# Patient Record
Sex: FEMALE | URBAN - METROPOLITAN AREA
[De-identification: names, ages, dates, MRNs, and addresses within clinical notes are randomized per-mention and may not be internally consistent; named-entity substitution may affect disease eponyms.]

---

## 2020-03-10 ENCOUNTER — APPOINTMENT (RX ONLY)
Dept: URBAN - METROPOLITAN AREA CLINIC 168 | Facility: CLINIC | Age: 30
Setting detail: DERMATOLOGY
End: 2020-03-10

## 2020-03-10 DIAGNOSIS — B36.0 PITYRIASIS VERSICOLOR: ICD-10-CM

## 2020-03-10 PROCEDURE — 87220 TISSUE EXAM FOR FUNGI: CPT

## 2020-03-10 PROCEDURE — ? KOH PREP

## 2020-03-10 PROCEDURE — ? COUNSELING

## 2020-03-10 PROCEDURE — 99202 OFFICE O/P NEW SF 15 MIN: CPT

## 2020-03-10 PROCEDURE — ? PRESCRIPTION

## 2020-03-10 RX ORDER — KETOCONAZOLE 20 MG/G
CREAM TOPICAL BID
Qty: 1 | Refills: 2 | Status: ERX | COMMUNITY
Start: 2020-03-10

## 2020-03-10 RX ADMIN — KETOCONAZOLE: 20 CREAM TOPICAL at 00:00

## 2020-03-10 ASSESSMENT — LOCATION ZONE DERM: LOCATION ZONE: LEG

## 2020-03-10 ASSESSMENT — LOCATION SIMPLE DESCRIPTION DERM: LOCATION SIMPLE: RIGHT THIGH

## 2020-03-10 ASSESSMENT — LOCATION DETAILED DESCRIPTION DERM: LOCATION DETAILED: RIGHT ANTERIOR PROXIMAL THIGH

## 2020-03-10 NOTE — HPI: DISCOLORATION (HYPERPIGMENTATION)
Is This A New Presentation, Or A Follow-Up?: Discoloration
How Severe Is It?: mild
Additional History: Patient states that she noticed a hyperpigmentation of her bilateral proximal thighs. She showed it to her OBGYN a year ago and was given anti-bacterial cream and it helped slightly, but it never resolved. She states that it is not itchy or painful.

## 2020-03-10 NOTE — PROCEDURE: COUNSELING
Patient Specific Counseling (Will Not Stick From Patient To Patient): **Rx given for ketoconazole cream to apply to affected areas BID x 2 weeks then PRN\\n**Recommend OTC selenium sulfide shampoo twice weekly
Detail Level: Detailed